# Patient Record
Sex: FEMALE | Race: BLACK OR AFRICAN AMERICAN | NOT HISPANIC OR LATINO | Employment: OTHER | URBAN - METROPOLITAN AREA
[De-identification: names, ages, dates, MRNs, and addresses within clinical notes are randomized per-mention and may not be internally consistent; named-entity substitution may affect disease eponyms.]

---

## 2017-02-21 ENCOUNTER — TELEPHONE (OUTPATIENT)
Dept: FAMILY MEDICINE | Facility: CLINIC | Age: 72
End: 2017-02-21

## 2017-02-21 NOTE — TELEPHONE ENCOUNTER
----- Message from Amber Jack sent at 2/21/2017  9:52 AM CST -----  Contact: Self   Pt calling to speak to a nurse regarding her health. Please call [phone]

## 2017-02-22 ENCOUNTER — TELEPHONE (OUTPATIENT)
Dept: FAMILY MEDICINE | Facility: CLINIC | Age: 72
End: 2017-02-22

## 2017-02-22 NOTE — TELEPHONE ENCOUNTER
----- Message from Christel Wang sent at 2/21/2017 11:17 AM CST -----  Contact: Self  Pt returned call. Pt can be reached @ [phone].

## 2017-02-23 ENCOUNTER — TELEPHONE (OUTPATIENT)
Dept: FAMILY MEDICINE | Facility: CLINIC | Age: 72
End: 2017-02-23

## 2017-02-23 NOTE — TELEPHONE ENCOUNTER
Received fax from pharmacy informing that pts jaime is available in a cost saving generic alternative; called pt to see if she was ok with changing to generic; no answer; LM to call office

## 2017-03-01 NOTE — TELEPHONE ENCOUNTER
Informed pt of below; pt does not want to change to generic, states her body is sensitive to medications and she would prefer to stay on brand

## 2017-03-06 ENCOUNTER — TELEPHONE (OUTPATIENT)
Dept: FAMILY MEDICINE | Facility: CLINIC | Age: 72
End: 2017-03-06

## 2017-03-06 NOTE — TELEPHONE ENCOUNTER
----- Message from Magdalena Patel sent at 3/3/2017  2:36 PM CST -----  Contact: self  Pt stated that [facility] will fill the script for jaime. Please advise.    [phone]-[facility]

## 2017-03-06 NOTE — TELEPHONE ENCOUNTER
Advised patient that script for Prashant was sent to [facility] on 3/3/2017.  Patient verbalized understanding.

## 2017-03-19 DIAGNOSIS — I10 ESSENTIAL HYPERTENSION: ICD-10-CM

## 2017-03-20 DIAGNOSIS — E03.4 HYPOTHYROIDISM DUE TO ACQUIRED ATROPHY OF THYROID: ICD-10-CM

## 2017-03-20 RX ORDER — FUROSEMIDE 20 MG/1
TABLET ORAL
Qty: 30 TABLET | Refills: 0 | Status: SHIPPED | OUTPATIENT
Start: 2017-03-20 | End: 2017-09-06 | Stop reason: SDUPTHER

## 2017-03-20 RX ORDER — LEVOTHYROXINE SODIUM 50 UG/1
TABLET ORAL
Qty: 30 TABLET | Refills: 0 | Status: SHIPPED | OUTPATIENT
Start: 2017-03-20 | End: 2017-09-06 | Stop reason: SDUPTHER

## 2017-03-23 DIAGNOSIS — I10 ESSENTIAL HYPERTENSION: ICD-10-CM

## 2017-03-23 RX ORDER — POTASSIUM CHLORIDE 750 MG/1
TABLET, EXTENDED RELEASE ORAL
Qty: 30 TABLET | Refills: 0 | Status: SHIPPED | OUTPATIENT
Start: 2017-03-23 | End: 2017-09-06 | Stop reason: SDUPTHER

## 2017-03-27 RX ORDER — METFORMIN HYDROCHLORIDE 500 MG/1
TABLET ORAL
Qty: 30 TABLET | Refills: 0 | Status: SHIPPED | OUTPATIENT
Start: 2017-03-27 | End: 2017-03-31 | Stop reason: SDUPTHER

## 2017-03-27 NOTE — LETTER
March 30, 2017    Shefali Fowler  [address]  [place] [address]             [facility]  [address] [place] [address]  Phone: [phone]  Fax: [phone] Dear Mrs. Fowler:    This letter is a reminder that your Hgb A1C test is due. Please call our office to schedule an appointment.    If you've already underwent or scheduled this procedure, please disregard this notice.    Sincerely,        Mariana Dinh LPN

## 2017-03-31 RX ORDER — METFORMIN HYDROCHLORIDE 500 MG/1
TABLET ORAL
Qty: 30 TABLET | Refills: 0 | Status: SHIPPED | OUTPATIENT
Start: 2017-03-31 | End: 2017-09-06 | Stop reason: SDUPTHER

## 2017-04-05 LAB — HEMOGLOBIN A1: 7.3 (ref ?–5.7)

## 2017-04-06 LAB
CHOLESTEROL, TOTAL: 176 (ref 125–200)
CHOLESTEROL/HDL RATIO SCREEN: 2.1 (ref ?–5)
HDLC SERPL-MCNC: 85 MG/DL (ref ?–46)
LDLC SERPL CALC-MCNC: 78 MG/DL (ref ?–130)
TRIGL SERPL-MCNC: 64 MG/DL (ref ?–150)

## 2017-08-15 DIAGNOSIS — N95.1 MENOPAUSAL SYMPTOMS: ICD-10-CM

## 2017-08-15 NOTE — TELEPHONE ENCOUNTER
----- Message from Dona Soler sent at 8/14/2017  1:57 PM CDT -----  Patient states they no longer manufactor cloNIDine 0.1 mg/24 hr td ptwk (CATAPRES) 0.1 mg/24 hr. She states she tried the new patches but makes her sick.     Also, she needs a refill on PREMARIN 0.625 mg tablet.    Patient would like a call back at [phone] Thank you!

## 2017-08-15 NOTE — TELEPHONE ENCOUNTER
for catapres patch changed and it makes pt sick, she looked at different pharmacies but no one had  she wanted, pt has not taken since last month and blood pressure has been running 130/80's; also needs refill on premarin; she has OV scheduled for 9/6/17

## 2017-08-24 DIAGNOSIS — Z12.11 COLON CANCER SCREENING: ICD-10-CM

## 2017-08-31 RX ORDER — CLONIDINE 0.1 MG/24H
PATCH, EXTENDED RELEASE TRANSDERMAL
Qty: 4 PATCH | Refills: 0 | Status: SHIPPED | OUTPATIENT
Start: 2017-08-31 | End: 2017-09-06

## 2017-09-06 ENCOUNTER — OFFICE VISIT (OUTPATIENT)
Dept: FAMILY MEDICINE | Facility: CLINIC | Age: 72
End: 2017-09-06
Payer: MEDICARE

## 2017-09-06 VITALS
WEIGHT: 152.13 LBS | BODY MASS INDEX: 23.88 KG/M2 | SYSTOLIC BLOOD PRESSURE: 120 MMHG | HEART RATE: 68 BPM | HEIGHT: 67 IN | OXYGEN SATURATION: 97 % | DIASTOLIC BLOOD PRESSURE: 68 MMHG | TEMPERATURE: 98 F

## 2017-09-06 DIAGNOSIS — I10 ESSENTIAL HYPERTENSION, BENIGN: ICD-10-CM

## 2017-09-06 DIAGNOSIS — N95.1 MENOPAUSAL SYMPTOMS: ICD-10-CM

## 2017-09-06 DIAGNOSIS — E03.4 HYPOTHYROIDISM DUE TO ACQUIRED ATROPHY OF THYROID: ICD-10-CM

## 2017-09-06 DIAGNOSIS — N18.2 CONTROLLED TYPE 2 DIABETES MELLITUS WITH STAGE 2 CHRONIC KIDNEY DISEASE, WITHOUT LONG-TERM CURRENT USE OF INSULIN: Primary | ICD-10-CM

## 2017-09-06 DIAGNOSIS — E11.22 CONTROLLED TYPE 2 DIABETES MELLITUS WITH STAGE 2 CHRONIC KIDNEY DISEASE, WITHOUT LONG-TERM CURRENT USE OF INSULIN: Primary | ICD-10-CM

## 2017-09-06 PROCEDURE — 3074F SYST BP LT 130 MM HG: CPT | Mod: ,,, | Performed by: FAMILY MEDICINE

## 2017-09-06 PROCEDURE — 4010F ACE/ARB THERAPY RXD/TAKEN: CPT | Mod: ,,, | Performed by: FAMILY MEDICINE

## 2017-09-06 PROCEDURE — 1126F AMNT PAIN NOTED NONE PRSNT: CPT | Mod: ,,, | Performed by: FAMILY MEDICINE

## 2017-09-06 PROCEDURE — 99999 PR PBB SHADOW E&M-EST. PATIENT-LVL III: CPT | Mod: PBBFAC,,, | Performed by: FAMILY MEDICINE

## 2017-09-06 PROCEDURE — 99214 OFFICE O/P EST MOD 30 MIN: CPT | Mod: S$PBB,,, | Performed by: FAMILY MEDICINE

## 2017-09-06 PROCEDURE — 99213 OFFICE O/P EST LOW 20 MIN: CPT | Mod: PBBFAC,PO | Performed by: FAMILY MEDICINE

## 2017-09-06 PROCEDURE — 1159F MED LIST DOCD IN RCRD: CPT | Mod: ,,, | Performed by: FAMILY MEDICINE

## 2017-09-06 PROCEDURE — 3078F DIAST BP <80 MM HG: CPT | Mod: ,,, | Performed by: FAMILY MEDICINE

## 2017-09-06 RX ORDER — CETIRIZINE HYDROCHLORIDE 10 MG/1
10 TABLET ORAL DAILY
Qty: 30 TABLET | Refills: 11 | Status: SHIPPED | OUTPATIENT
Start: 2017-09-06

## 2017-09-06 RX ORDER — SAXAGLIPTIN 2.5 MG/1
2.5 TABLET, FILM COATED ORAL DAILY
Qty: 30 TABLET | Refills: 5 | Status: SHIPPED | OUTPATIENT
Start: 2017-09-06 | End: 2017-11-13 | Stop reason: CLARIF

## 2017-09-06 RX ORDER — FUROSEMIDE 20 MG/1
20 TABLET ORAL DAILY
Qty: 30 TABLET | Refills: 5 | Status: SHIPPED | OUTPATIENT
Start: 2017-09-06

## 2017-09-06 RX ORDER — AMLODIPINE AND OLMESARTAN MEDOXOMIL 10; 40 MG/1; MG/1
1 TABLET ORAL DAILY
Qty: 30 TABLET | Refills: 5 | Status: SHIPPED | OUTPATIENT
Start: 2017-09-06 | End: 2017-11-27 | Stop reason: SDUPTHER

## 2017-09-06 RX ORDER — POTASSIUM CHLORIDE 750 MG/1
10 TABLET, EXTENDED RELEASE ORAL DAILY
Qty: 30 TABLET | Refills: 5 | Status: SHIPPED | OUTPATIENT
Start: 2017-09-06

## 2017-09-06 RX ORDER — BISOPROLOL FUMARATE AND HYDROCHLOROTHIAZIDE 10; 6.25 MG/1; MG/1
1 TABLET ORAL DAILY
Qty: 30 TABLET | Refills: 5 | Status: SHIPPED | OUTPATIENT
Start: 2017-09-06

## 2017-09-06 RX ORDER — LEVOTHYROXINE SODIUM 50 UG/1
50 TABLET ORAL DAILY
Qty: 30 TABLET | Refills: 5 | Status: SHIPPED | OUTPATIENT
Start: 2017-09-06

## 2017-09-06 RX ORDER — METFORMIN HYDROCHLORIDE 500 MG/1
500 TABLET ORAL DAILY
Qty: 30 TABLET | Refills: 5 | Status: SHIPPED | OUTPATIENT
Start: 2017-09-06 | End: 2017-11-13 | Stop reason: ALTCHOICE

## 2017-09-06 NOTE — PROGRESS NOTES
Subjective:       Patient ID: Shefali Fowler is a 72 y.o. female.    Chief Complaint: Medication Problem    HPI:  Here for follow up HTN, diabetes and CHF.  Discontinued Clonidine due to side effects.  BP has been stable.  Patient's diabetes is fairly well controlled  Review of Systems   Constitutional: Negative for fatigue and unexpected weight change.   Eyes: Negative for visual disturbance.   Respiratory: Negative for shortness of breath.    Cardiovascular: Negative for chest pain and leg swelling.   Endocrine: Negative for polydipsia, polyphagia and polyuria.   Skin: Negative for wound.   Neurological: Negative for numbness.       Objective:      Physical Exam   Constitutional: She is oriented to person, place, and time. She appears well-developed and well-nourished.   HENT:   Head: Normocephalic and atraumatic.   Mouth/Throat: Oropharynx is clear and moist.   Eyes: Conjunctivae are normal. Pupils are equal, round, and reactive to light.   Neck: Normal range of motion. Neck supple. No thyromegaly present.   Cardiovascular: Normal rate, regular rhythm and normal heart sounds.    No murmur heard.  Pulses:       Dorsalis pedis pulses are 2+ on the right side, and 2+ on the left side.   Pulmonary/Chest: Effort normal and breath sounds normal. No respiratory distress. She has no wheezes. She has no rales.   Abdominal: Soft. Bowel sounds are normal. She exhibits no distension and no mass. There is no tenderness.   Musculoskeletal: She exhibits no edema.        Right foot: There is no deformity.        Left foot: There is no deformity.   Feet:   Right Foot:   Protective Sensation: 5 sites tested. 5 sites sensed.   Skin Integrity: Negative for ulcer.   Left Foot:   Protective Sensation: 5 sites tested. 5 sites sensed.   Skin Integrity: Negative for ulcer.   Lymphadenopathy:     She has no cervical adenopathy.   Neurological: She is alert and oriented to person, place, and time.   Skin: Skin is warm and dry. No rash  noted.   Psychiatric: She has a normal mood and affect.         Assessment:       1. Controlled type 2 diabetes mellitus with stage 2 chronic kidney disease, without long-term current use of insulin    2. Essential hypertension, benign    3. Hypothyroidism due to acquired atrophy of thyroid    4. Menopausal symptoms        Plan:       Controlled type 2 diabetes mellitus with stage 2 chronic kidney disease, without long-term current use of insulin  -     SAXagliptin (ONGLYZA) 2.5 mg Tab tablet; Take 1 tablet (2.5 mg total) by mouth once daily.  Dispense: 30 tablet; Refill: 5  -     metformin (GLUCOPHAGE) 500 MG tablet; Take 1 tablet (500 mg total) by mouth once daily.  Dispense: 30 tablet; Refill: 5    Essential hypertension, benign  -     potassium chloride (KLOR-CON) 10 MEQ TbSR; Take 1 tablet (10 mEq total) by mouth once daily.  Dispense: 30 tablet; Refill: 5  -     furosemide (LASIX) 20 MG tablet; Take 1 tablet (20 mg total) by mouth once daily.  Dispense: 30 tablet; Refill: 5  -     amlodipine-olmesartan (VIVEK) 10-40 mg per tablet; Take 1 tablet by mouth once daily.  Dispense: 30 tablet; Refill: 5  -     bisoprolol-hydrochlorothiazide (ZIAC) 10-6.25 mg per tablet; Take 1 tablet by mouth once daily.  Dispense: 30 tablet; Refill: 5    Hypothyroidism due to acquired atrophy of thyroid  -     levothyroxine (SYNTHROID) 50 MCG tablet; Take 1 tablet (50 mcg total) by mouth once daily.  Dispense: 30 tablet; Refill: 5    Menopausal symptoms  -     estrogens, conjugated, (PREMARIN) 0.625 MG tablet; Take 1 tablet (0.625 mg total) by mouth once daily.  Dispense: 30 tablet; Refill: 5    Other orders  -     cetirizine (ZYRTEC) 10 MG tablet; Take 1 tablet (10 mg total) by mouth once daily.  Dispense: 30 tablet; Refill: 11            Return in about 6 months (around 3/6/2018).

## 2017-09-07 ENCOUNTER — TELEPHONE (OUTPATIENT)
Dept: ADMINISTRATIVE | Facility: HOSPITAL | Age: 72
End: 2017-09-07

## 2017-09-14 ENCOUNTER — TELEPHONE (OUTPATIENT)
Dept: FAMILY MEDICINE | Facility: CLINIC | Age: 72
End: 2017-09-14

## 2017-09-14 DIAGNOSIS — E11.22 CONTROLLED TYPE 2 DIABETES MELLITUS WITH STAGE 2 CHRONIC KIDNEY DISEASE, WITHOUT LONG-TERM CURRENT USE OF INSULIN: ICD-10-CM

## 2017-09-14 DIAGNOSIS — N18.2 CONTROLLED TYPE 2 DIABETES MELLITUS WITH STAGE 2 CHRONIC KIDNEY DISEASE, WITHOUT LONG-TERM CURRENT USE OF INSULIN: ICD-10-CM

## 2017-09-14 NOTE — TELEPHONE ENCOUNTER
----- Message from Gely Kennedy MD sent at 9/9/2017  8:06 PM CDT -----  Hba1c 7.3%.  Recommend we increase Onglyza to 5 mg daily.  Lipids wnl

## 2017-09-14 NOTE — TELEPHONE ENCOUNTER
According to our records hba1c 4/5/17 was 7.3%.  Hba1c was 6.9% 7/21/16.  I recommend we obtain a repeat hba1c now, since it has been almost been 6 months.

## 2017-09-19 ENCOUNTER — LAB VISIT (OUTPATIENT)
Dept: LAB | Facility: HOSPITAL | Age: 72
End: 2017-09-19
Attending: FAMILY MEDICINE
Payer: MEDICARE

## 2017-09-19 DIAGNOSIS — E11.22 CONTROLLED TYPE 2 DIABETES MELLITUS WITH STAGE 2 CHRONIC KIDNEY DISEASE, WITHOUT LONG-TERM CURRENT USE OF INSULIN: ICD-10-CM

## 2017-09-19 DIAGNOSIS — N18.2 CONTROLLED TYPE 2 DIABETES MELLITUS WITH STAGE 2 CHRONIC KIDNEY DISEASE, WITHOUT LONG-TERM CURRENT USE OF INSULIN: ICD-10-CM

## 2017-09-19 LAB
ESTIMATED AVG GLUCOSE: 157 MG/DL
HBA1C MFR BLD HPLC: 7.1 %

## 2017-09-19 PROCEDURE — 83036 HEMOGLOBIN GLYCOSYLATED A1C: CPT

## 2017-09-19 PROCEDURE — 36415 COLL VENOUS BLD VENIPUNCTURE: CPT | Mod: PO

## 2017-09-26 ENCOUNTER — TELEPHONE (OUTPATIENT)
Dept: FAMILY MEDICINE | Facility: CLINIC | Age: 72
End: 2017-09-26

## 2017-09-26 NOTE — TELEPHONE ENCOUNTER
Received fax from pharmacy stating prior authorization needed for onglyza; previous message pt states she is not taking onglyza and wanted to wait until repeat hgbA1c; results were 7.1 on 9/19/17; please advise if you want pt to take medication so I can complete prior authorization

## 2017-09-27 ENCOUNTER — TELEPHONE (OUTPATIENT)
Dept: FAMILY MEDICINE | Facility: CLINIC | Age: 72
End: 2017-09-27

## 2017-09-27 NOTE — TELEPHONE ENCOUNTER
----- Message from Gely Kennedy MD sent at 9/26/2017  5:58 PM CDT -----  hba1c 7.1%, will repeat labs in 3 months.

## 2017-11-13 ENCOUNTER — OFFICE VISIT (OUTPATIENT)
Dept: FAMILY MEDICINE | Facility: CLINIC | Age: 72
End: 2017-11-13
Payer: MEDICARE

## 2017-11-13 VITALS
HEART RATE: 72 BPM | DIASTOLIC BLOOD PRESSURE: 70 MMHG | WEIGHT: 147.5 LBS | BODY MASS INDEX: 23.15 KG/M2 | TEMPERATURE: 98 F | OXYGEN SATURATION: 99 % | HEIGHT: 67 IN | RESPIRATION RATE: 16 BRPM | SYSTOLIC BLOOD PRESSURE: 132 MMHG

## 2017-11-13 DIAGNOSIS — R19.7 DIARRHEA, UNSPECIFIED TYPE: Primary | ICD-10-CM

## 2017-11-13 DIAGNOSIS — F41.8 SITUATIONAL ANXIETY: ICD-10-CM

## 2017-11-13 DIAGNOSIS — E11.22 CONTROLLED TYPE 2 DIABETES MELLITUS WITH STAGE 2 CHRONIC KIDNEY DISEASE, WITHOUT LONG-TERM CURRENT USE OF INSULIN: ICD-10-CM

## 2017-11-13 DIAGNOSIS — N18.2 CONTROLLED TYPE 2 DIABETES MELLITUS WITH STAGE 2 CHRONIC KIDNEY DISEASE, WITHOUT LONG-TERM CURRENT USE OF INSULIN: ICD-10-CM

## 2017-11-13 PROCEDURE — 99214 OFFICE O/P EST MOD 30 MIN: CPT | Mod: S$PBB,,, | Performed by: FAMILY MEDICINE

## 2017-11-13 PROCEDURE — 99213 OFFICE O/P EST LOW 20 MIN: CPT | Mod: PBBFAC,PO | Performed by: FAMILY MEDICINE

## 2017-11-13 PROCEDURE — 99999 PR PBB SHADOW E&M-EST. PATIENT-LVL III: CPT | Mod: PBBFAC,,, | Performed by: FAMILY MEDICINE

## 2017-11-13 RX ORDER — GLIMEPIRIDE 1 MG/1
1 TABLET ORAL
Qty: 30 TABLET | Refills: 2 | Status: SHIPPED | OUTPATIENT
Start: 2017-11-13 | End: 2018-03-22 | Stop reason: SDUPTHER

## 2017-11-13 RX ORDER — BUSPIRONE HYDROCHLORIDE 15 MG/1
15 TABLET ORAL 3 TIMES DAILY PRN
Qty: 45 TABLET | Refills: 0 | Status: SHIPPED | OUTPATIENT
Start: 2017-11-13 | End: 2018-03-28

## 2017-11-13 RX ORDER — DICYCLOMINE HYDROCHLORIDE 20 MG/1
20 TABLET ORAL
Qty: 90 TABLET | Refills: 2 | Status: SHIPPED | OUTPATIENT
Start: 2017-11-13 | End: 2017-12-13

## 2017-11-13 NOTE — PROGRESS NOTES
Subjective:       Patient ID: Shefali Fowler is a 72 y.o. female.    Chief Complaint: Abdominal Pain (patient is grieving , and started having stomach ache , diarrhea. declines vaccines)    HPI:  Patient presents with upset stomach and diarrhea x 3 weeks.  Takes Metformin for diabetes since 2009.  Last c-scope 4/17.  A lot of stress recently due to death of family member.  Denies mucus or blood in stool. No recent antibiotics. Diarrhea does not awake her from sleep.  Review of Systems   Constitutional: Negative for fever.   Gastrointestinal: Negative for abdominal pain (cramping), blood in stool and vomiting.       Objective:      Physical Exam   Constitutional: She is oriented to person, place, and time. She appears well-developed and well-nourished.   Eyes: No scleral icterus.   Neck: Normal range of motion. Neck supple. No thyromegaly present.   Cardiovascular: Normal rate and regular rhythm.  Exam reveals no gallop and no friction rub.    No murmur heard.  Pulmonary/Chest: Effort normal and breath sounds normal. She has no wheezes. She has no rales.   Abdominal: Soft. Bowel sounds are normal. She exhibits no distension and no mass. There is no hepatosplenomegaly. There is no tenderness.   Musculoskeletal: She exhibits no edema.   Lymphadenopathy:     She has no cervical adenopathy.     She has no axillary adenopathy.        Right: No supraclavicular adenopathy present.        Left: No supraclavicular adenopathy present.   Neurological: She is alert and oriented to person, place, and time.   Skin: Skin is warm and dry. No rash noted.   Psychiatric: She has a normal mood and affect. Her behavior is normal.         Assessment:       1. Diarrhea, unspecified type    2. Controlled type 2 diabetes mellitus with stage 2 chronic kidney disease, without long-term current use of insulin        Plan:       Diarrhea, unspecified type  -     dicyclomine (BENTYL) 20 mg tablet; Take 1 tablet (20 mg total) by mouth every meal as  needed.  Dispense: 90 tablet; Refill: 2  -     Clostridium difficile EIA; Future; Expected date: 11/27/2017    Controlled type 2 diabetes mellitus with stage 2 chronic kidney disease, without long-term current use of insulin  -     glimepiride (AMARYL) 1 MG tablet; Take 1 tablet (1 mg total) by mouth before breakfast.  Dispense: 30 tablet; Refill: 2  Trial of metformin due to diarrhea      Return in about 2 weeks (around 11/27/2017).

## 2017-11-27 ENCOUNTER — OFFICE VISIT (OUTPATIENT)
Dept: FAMILY MEDICINE | Facility: CLINIC | Age: 72
End: 2017-11-27
Payer: MEDICARE

## 2017-11-27 VITALS
DIASTOLIC BLOOD PRESSURE: 72 MMHG | BODY MASS INDEX: 23.36 KG/M2 | TEMPERATURE: 98 F | OXYGEN SATURATION: 99 % | HEIGHT: 67 IN | WEIGHT: 148.81 LBS | HEART RATE: 79 BPM | SYSTOLIC BLOOD PRESSURE: 136 MMHG

## 2017-11-27 DIAGNOSIS — I10 ESSENTIAL HYPERTENSION, BENIGN: ICD-10-CM

## 2017-11-27 DIAGNOSIS — E11.22 CONTROLLED TYPE 2 DIABETES MELLITUS WITH STAGE 2 CHRONIC KIDNEY DISEASE, WITHOUT LONG-TERM CURRENT USE OF INSULIN: ICD-10-CM

## 2017-11-27 DIAGNOSIS — R19.7 DIARRHEA, UNSPECIFIED TYPE: Primary | ICD-10-CM

## 2017-11-27 DIAGNOSIS — N18.2 CONTROLLED TYPE 2 DIABETES MELLITUS WITH STAGE 2 CHRONIC KIDNEY DISEASE, WITHOUT LONG-TERM CURRENT USE OF INSULIN: ICD-10-CM

## 2017-11-27 PROCEDURE — 99999 PR PBB SHADOW E&M-EST. PATIENT-LVL III: CPT | Mod: PBBFAC,,, | Performed by: FAMILY MEDICINE

## 2017-11-27 PROCEDURE — 99213 OFFICE O/P EST LOW 20 MIN: CPT | Mod: PBBFAC,PO | Performed by: FAMILY MEDICINE

## 2017-11-27 PROCEDURE — 99213 OFFICE O/P EST LOW 20 MIN: CPT | Mod: S$PBB,,, | Performed by: FAMILY MEDICINE

## 2017-11-27 RX ORDER — AMLODIPINE AND OLMESARTAN MEDOXOMIL 10; 40 MG/1; MG/1
1 TABLET ORAL DAILY
Qty: 30 TABLET | Refills: 5 | Status: SHIPPED | OUTPATIENT
Start: 2017-11-27 | End: 2018-04-20

## 2017-11-27 NOTE — PROGRESS NOTES
Subjective:       Patient ID: Shefali Fowler is a 72 y.o. female.    Chief Complaint: Diarrhea (follow up)    HPI:  Here for follow up diarrhea.  Diarrhea resolved with discontinuation of Metformin. Reports high fiber diet.  Only having 1 bowel movement daily.  Discontinued dicyclomine.  Blood sugars stable on Onglyza.  BS range 103-138.  Patient reports wheezing x 1 week.  She reports being diagnosed with CHF in the past.  No URI symptoms.  Review of Systems   Constitutional: Negative for unexpected weight change.   Cardiovascular: Negative for leg swelling.   Gastrointestinal: Negative for abdominal pain.       Objective:      Physical Exam   Constitutional: She appears well-developed and well-nourished.   Musculoskeletal: She exhibits no edema.         Assessment:       1. Diarrhea, unspecified type    2. Essential hypertension, benign    3. Controlled type 2 diabetes mellitus with stage 2 chronic kidney disease, without long-term current use of insulin        Plan:       Diarrhea, unspecified type  Resolved, suspect related to metformin    Essential hypertension, benign  -     amlodipine-olmesartan (VIVEK) 10-40 mg per tablet; Take 1 tablet by mouth once daily.  Dispense: 30 tablet; Refill: 5    Controlled type 2 diabetes mellitus with stage 2 chronic kidney disease, without long-term current use of insulin  Glucose stable on Onglyza          No Follow-up on file.

## 2018-01-19 DIAGNOSIS — N95.1 MENOPAUSAL SYMPTOMS: ICD-10-CM

## 2018-01-19 DIAGNOSIS — Z12.31 ENCOUNTER FOR SCREENING MAMMOGRAM FOR BREAST CANCER: Primary | ICD-10-CM

## 2018-01-20 RX ORDER — ESTROGENS, CONJUGATED 0.62 MG/1
TABLET, FILM COATED ORAL
Qty: 30 TABLET | Refills: 0 | OUTPATIENT
Start: 2018-01-20

## 2018-01-22 NOTE — TELEPHONE ENCOUNTER
Pt informed due for mammogram; she states cannot afford at this time, states she has higher deductible and cannot get done right now; states she will call back in few months to get done

## 2018-01-30 ENCOUNTER — TELEPHONE (OUTPATIENT)
Dept: FAMILY MEDICINE | Facility: CLINIC | Age: 73
End: 2018-01-30

## 2018-01-30 NOTE — TELEPHONE ENCOUNTER
----- Message from Elsa Hunt sent at 1/30/2018  1:53 PM CST -----  Contact: self  amlodipine-olmesartan (VIVEK) 10-40 mg per tablet    Patient calling to speak with staff regarding medication listed above. She can be reached at [phone]. Thank you!

## 2018-01-30 NOTE — TELEPHONE ENCOUNTER
Pt states she had to start taking the generic amlodipine-olmesartan because jaime was not covered by her insurance anymore, she states the generic medication is causing her to have slight chest discomfort, and makes her feet cold; she is requesting alternative medication be sent in

## 2018-02-04 DIAGNOSIS — N95.1 MENOPAUSAL SYMPTOMS: ICD-10-CM

## 2018-02-05 ENCOUNTER — OFFICE VISIT (OUTPATIENT)
Dept: FAMILY MEDICINE | Facility: CLINIC | Age: 73
End: 2018-02-05
Payer: MEDICARE

## 2018-02-05 ENCOUNTER — LAB VISIT (OUTPATIENT)
Dept: LAB | Facility: HOSPITAL | Age: 73
End: 2018-02-05
Attending: FAMILY MEDICINE
Payer: MEDICARE

## 2018-02-05 VITALS
HEIGHT: 67 IN | DIASTOLIC BLOOD PRESSURE: 76 MMHG | BODY MASS INDEX: 22.32 KG/M2 | OXYGEN SATURATION: 98 % | SYSTOLIC BLOOD PRESSURE: 124 MMHG | TEMPERATURE: 98 F | RESPIRATION RATE: 14 BRPM | HEART RATE: 72 BPM | WEIGHT: 142.19 LBS

## 2018-02-05 DIAGNOSIS — M94.9 DISORDER OF BONE AND CARTILAGE: ICD-10-CM

## 2018-02-05 DIAGNOSIS — Z11.59 NEED FOR HEPATITIS C SCREENING TEST: ICD-10-CM

## 2018-02-05 DIAGNOSIS — N18.2 CONTROLLED TYPE 2 DIABETES MELLITUS WITH STAGE 2 CHRONIC KIDNEY DISEASE, WITHOUT LONG-TERM CURRENT USE OF INSULIN: ICD-10-CM

## 2018-02-05 DIAGNOSIS — I10 ESSENTIAL HYPERTENSION, BENIGN: ICD-10-CM

## 2018-02-05 DIAGNOSIS — E11.22 CONTROLLED TYPE 2 DIABETES MELLITUS WITH STAGE 2 CHRONIC KIDNEY DISEASE, WITHOUT LONG-TERM CURRENT USE OF INSULIN: ICD-10-CM

## 2018-02-05 DIAGNOSIS — E03.9 ACQUIRED HYPOTHYROIDISM: ICD-10-CM

## 2018-02-05 DIAGNOSIS — Z23 NEEDS FLU SHOT: Primary | ICD-10-CM

## 2018-02-05 DIAGNOSIS — M89.9 DISORDER OF BONE AND CARTILAGE: ICD-10-CM

## 2018-02-05 LAB
ALBUMIN SERPL BCP-MCNC: 3.4 G/DL
ALP SERPL-CCNC: 71 U/L
ALT SERPL W/O P-5'-P-CCNC: 144 U/L
ANION GAP SERPL CALC-SCNC: 7 MMOL/L
AST SERPL-CCNC: 124 U/L
BASOPHILS # BLD AUTO: 0.03 K/UL
BASOPHILS NFR BLD: 0.7 %
BILIRUB SERPL-MCNC: 0.4 MG/DL
BUN SERPL-MCNC: 9 MG/DL
CALCIUM SERPL-MCNC: 9.4 MG/DL
CHLORIDE SERPL-SCNC: 98 MMOL/L
CHOLEST SERPL-MCNC: 181 MG/DL
CHOLEST/HDLC SERPL: 2.1 {RATIO}
CO2 SERPL-SCNC: 29 MMOL/L
CREAT SERPL-MCNC: 0.9 MG/DL
DIFFERENTIAL METHOD: ABNORMAL
EOSINOPHIL # BLD AUTO: 0.2 K/UL
EOSINOPHIL NFR BLD: 5.5 %
ERYTHROCYTE [DISTWIDTH] IN BLOOD BY AUTOMATED COUNT: 12.7 %
EST. GFR  (AFRICAN AMERICAN): >60 ML/MIN/1.73 M^2
EST. GFR  (NON AFRICAN AMERICAN): >60 ML/MIN/1.73 M^2
GLUCOSE SERPL-MCNC: 142 MG/DL
HCT VFR BLD AUTO: 37.7 %
HDLC SERPL-MCNC: 85 MG/DL
HDLC SERPL: 47 %
HGB BLD-MCNC: 12.3 G/DL
IMM GRANULOCYTES # BLD AUTO: 0.01 K/UL
IMM GRANULOCYTES NFR BLD AUTO: 0.2 %
LDLC SERPL CALC-MCNC: 87.8 MG/DL
LYMPHOCYTES # BLD AUTO: 1.8 K/UL
LYMPHOCYTES NFR BLD: 42.3 %
MCH RBC QN AUTO: 28.7 PG
MCHC RBC AUTO-ENTMCNC: 32.6 G/DL
MCV RBC AUTO: 88 FL
MONOCYTES # BLD AUTO: 0.6 K/UL
MONOCYTES NFR BLD: 12.6 %
NEUTROPHILS # BLD AUTO: 1.7 K/UL
NEUTROPHILS NFR BLD: 38.7 %
NONHDLC SERPL-MCNC: 96 MG/DL
NRBC BLD-RTO: 0 /100 WBC
PLATELET # BLD AUTO: 217 K/UL
PMV BLD AUTO: 10.6 FL
POTASSIUM SERPL-SCNC: 4.1 MMOL/L
PROT SERPL-MCNC: 7.8 G/DL
RBC # BLD AUTO: 4.28 M/UL
SODIUM SERPL-SCNC: 134 MMOL/L
TRIGL SERPL-MCNC: 41 MG/DL
TSH SERPL DL<=0.005 MIU/L-ACNC: 3.17 UIU/ML
WBC # BLD AUTO: 4.35 K/UL

## 2018-02-05 PROCEDURE — 90662 IIV NO PRSV INCREASED AG IM: CPT | Mod: PBBFAC,PO

## 2018-02-05 PROCEDURE — 85025 COMPLETE CBC W/AUTO DIFF WBC: CPT

## 2018-02-05 PROCEDURE — 36415 COLL VENOUS BLD VENIPUNCTURE: CPT | Mod: PO

## 2018-02-05 PROCEDURE — 84443 ASSAY THYROID STIM HORMONE: CPT

## 2018-02-05 PROCEDURE — 1159F MED LIST DOCD IN RCRD: CPT | Mod: ,,, | Performed by: FAMILY MEDICINE

## 2018-02-05 PROCEDURE — 80053 COMPREHEN METABOLIC PANEL: CPT

## 2018-02-05 PROCEDURE — 86803 HEPATITIS C AB TEST: CPT

## 2018-02-05 PROCEDURE — 1126F AMNT PAIN NOTED NONE PRSNT: CPT | Mod: ,,, | Performed by: FAMILY MEDICINE

## 2018-02-05 PROCEDURE — 99214 OFFICE O/P EST MOD 30 MIN: CPT | Mod: PBBFAC,PO | Performed by: FAMILY MEDICINE

## 2018-02-05 PROCEDURE — 99214 OFFICE O/P EST MOD 30 MIN: CPT | Mod: S$PBB,,, | Performed by: FAMILY MEDICINE

## 2018-02-05 PROCEDURE — 99999 PR PBB SHADOW E&M-EST. PATIENT-LVL IV: CPT | Mod: PBBFAC,,, | Performed by: FAMILY MEDICINE

## 2018-02-05 PROCEDURE — 80061 LIPID PANEL: CPT

## 2018-02-05 RX ORDER — PRAVASTATIN SODIUM 20 MG/1
20 TABLET ORAL DAILY
Qty: 90 TABLET | Refills: 3 | Status: SHIPPED | OUTPATIENT
Start: 2018-02-05 | End: 2019-02-05

## 2018-02-05 RX ORDER — ESTROGENS, CONJUGATED 0.62 MG/1
TABLET, FILM COATED ORAL
Qty: 30 TABLET | Refills: 0 | Status: SHIPPED | OUTPATIENT
Start: 2018-02-05 | End: 2018-03-21 | Stop reason: SDUPTHER

## 2018-02-05 NOTE — PROGRESS NOTES
Subjective:       Patient ID: Shefali Fowler is a 72 y.o. female.    Chief Complaint: Medication Problem (since starting new meds , chest discomfort , itching)    HPI:  Here to discuss BP medication.  Reports side effects of itching with generic Prashant.  Also medication is costly.  BP stable.  Glucose stable.  Diarrhea resolved.  Review of Systems   Endocrine: Negative for polydipsia and polyphagia.   Musculoskeletal: Positive for myalgias.       Objective:    /70  Physical Exam   Constitutional: She appears well-developed and well-nourished.   Pulmonary/Chest: Effort normal.         Assessment:       1. Needs flu shot    2. Disorder of bone and cartilage    3. Need for hepatitis C screening test    4. Essential hypertension, benign    5. Acquired hypothyroidism    6. Controlled type 2 diabetes mellitus with stage 2 chronic kidney disease, without long-term current use of insulin        Plan:       Needs flu shot  -     Influenza - High Dose (65+) (PF) (IM)    Disorder of bone and cartilage  -     DXA Bone Density Spine And Hip; Future; Expected date: 02/05/2018    Need for hepatitis C screening test  -     Hepatitis C antibody; Future; Expected date: 02/05/2018    Essential hypertension, benign  BP stable  -     Ambulatory Referral to Pharmacy Assistance  -     Lipid panel; Future; Expected date: 02/05/2018  -     Comprehensive metabolic panel; Future; Expected date: 02/05/2018  -     CBC auto differential; Future; Expected date: 02/05/2018    Acquired hypothyroidism  Clinically stable  -     TSH; Future; Expected date: 02/05/2018    Controlled type 2 diabetes mellitus with stage 2 chronic kidney disease, without long-term current use of insulin  -     Add pravastatin (PRAVACHOL) 20 MG tablet; Take 1 tablet (20 mg total) by mouth once daily.  Dispense: 90 tablet; Refill: 3      Follow-up in about 6 months (around 8/5/2018).

## 2018-02-06 LAB — HCV AB SERPL QL IA: NEGATIVE

## 2018-02-15 ENCOUNTER — TELEPHONE (OUTPATIENT)
Dept: FAMILY MEDICINE | Facility: CLINIC | Age: 73
End: 2018-02-15

## 2018-02-15 NOTE — TELEPHONE ENCOUNTER
Pt wants result mailed to her. Pt says she has too much going on to discuss results over the phone        ----- Message from Gely Kennedy MD sent at 2/13/2018 11:04 PM CST -----  Abnormal liver test.  Hold pravastatin (cholesterol medication) for now.  Would like to get a liver ultrasound and consider hepatology consult.

## 2018-02-20 ENCOUNTER — TELEPHONE (OUTPATIENT)
Dept: FAMILY MEDICINE | Facility: CLINIC | Age: 73
End: 2018-02-20

## 2018-02-20 DIAGNOSIS — R79.89 ABNORMAL LFTS: Primary | ICD-10-CM

## 2018-02-20 NOTE — TELEPHONE ENCOUNTER
Pt informed of results and recommendations; states she will get liver ultrasound done next week; please enter orders; does not want to see any other doctors at this time

## 2018-03-14 ENCOUNTER — TELEPHONE (OUTPATIENT)
Dept: FAMILY MEDICINE | Facility: CLINIC | Age: 73
End: 2018-03-14

## 2018-03-14 NOTE — TELEPHONE ENCOUNTER
Pt states she is needing copy of orders for test she has scheduled because she is trying to get medicare to cover the 20% that she is supposed to pay; will  tomorrow

## 2018-03-14 NOTE — TELEPHONE ENCOUNTER
----- Message from Rianna Edgardo sent at 3/14/2018  3:14 PM CDT -----  Contact: self  Pt is asking for a copy of her orders for the test she needs to take to provide to Medicare for .  Pt call back # [phone]

## 2018-03-20 ENCOUNTER — TELEPHONE (OUTPATIENT)
Dept: FAMILY MEDICINE | Facility: CLINIC | Age: 73
End: 2018-03-20

## 2018-03-20 NOTE — TELEPHONE ENCOUNTER
Pt states appointment reminder said she has multiple test ordered and she wanted to clarify what test she had scheduled for tomorrow; informed her she has US abdomen and bone density; pt verbalized understanding; also wanted to know how long test takes; informed her I do not know exact time but it shouldn't take too long

## 2018-03-20 NOTE — TELEPHONE ENCOUNTER
----- Message from Gely Kennedy MD sent at 3/18/2018  4:57 PM CDT -----  Please call patient and send certified letter informing patient she needs to schedule liver u/s for abnormal LFT'

## 2018-03-20 NOTE — TELEPHONE ENCOUNTER
Liver US is scheduled for tomorrow; I did speak to her this morning regarding test scheduled for tomorrow; pt states she is going to get it done

## 2018-03-20 NOTE — TELEPHONE ENCOUNTER
----- Message from Rianna Reynoso sent at 3/20/2018  9:57 AM CDT -----  Contact: self  Pt is asking for a call back regarding procedures she has scheduled.    [phone]

## 2018-03-21 ENCOUNTER — HOSPITAL ENCOUNTER (OUTPATIENT)
Dept: RADIOLOGY | Facility: HOSPITAL | Age: 73
Discharge: HOME | End: 2018-03-21
Attending: FAMILY MEDICINE
Payer: MEDICARE

## 2018-03-21 DIAGNOSIS — N95.1 MENOPAUSAL SYMPTOMS: ICD-10-CM

## 2018-03-21 DIAGNOSIS — M89.9 DISORDER OF BONE AND CARTILAGE: ICD-10-CM

## 2018-03-21 DIAGNOSIS — M94.9 DISORDER OF BONE AND CARTILAGE: ICD-10-CM

## 2018-03-21 DIAGNOSIS — R79.89 ABNORMAL LFTS: ICD-10-CM

## 2018-03-21 PROCEDURE — 76700 US EXAM ABDOM COMPLETE: CPT | Mod: TC

## 2018-03-21 PROCEDURE — 76700 US EXAM ABDOM COMPLETE: CPT | Mod: 26,,, | Performed by: RADIOLOGY

## 2018-03-21 PROCEDURE — 77080 DXA BONE DENSITY AXIAL: CPT | Mod: 26,,, | Performed by: RADIOLOGY

## 2018-03-21 PROCEDURE — 77080 DXA BONE DENSITY AXIAL: CPT | Mod: TC

## 2018-03-22 ENCOUNTER — TELEPHONE (OUTPATIENT)
Dept: FAMILY MEDICINE | Facility: CLINIC | Age: 73
End: 2018-03-22

## 2018-03-22 DIAGNOSIS — N18.2 CONTROLLED TYPE 2 DIABETES MELLITUS WITH STAGE 2 CHRONIC KIDNEY DISEASE, WITHOUT LONG-TERM CURRENT USE OF INSULIN: ICD-10-CM

## 2018-03-22 DIAGNOSIS — R79.89 ABNORMAL LIVER FUNCTION TEST: Primary | ICD-10-CM

## 2018-03-22 DIAGNOSIS — E11.22 CONTROLLED TYPE 2 DIABETES MELLITUS WITH STAGE 2 CHRONIC KIDNEY DISEASE, WITHOUT LONG-TERM CURRENT USE OF INSULIN: ICD-10-CM

## 2018-03-22 RX ORDER — GLIMEPIRIDE 1 MG/1
TABLET ORAL
Qty: 30 TABLET | Refills: 0 | Status: SHIPPED | OUTPATIENT
Start: 2018-03-22

## 2018-03-22 NOTE — TELEPHONE ENCOUNTER
Informed pt that BMD  Is wnl . Pt verbally understood.    ----- Message from Gely Kennedy MD sent at 3/22/2018 11:04 AM CDT -----  BMD test wnl

## 2018-03-22 NOTE — TELEPHONE ENCOUNTER
Notified of Rx refill and information below. Pt scheduled. Pending orders. Please advise of orders to be linked

## 2018-03-23 ENCOUNTER — DOCUMENTATION ONLY (OUTPATIENT)
Dept: TRANSPLANT | Facility: CLINIC | Age: 73
End: 2018-03-23

## 2018-03-23 NOTE — NURSING
Pt records reviewed.   Pt will be referred to Hepatology.    Initial referral received  from the [facility].   Referring Provider/diagnosis  RADHA SHIELDS Provider:   Diagnosis: Abnormal liver function test           Referral letter sent to provider and patient.

## 2018-03-27 NOTE — PROGRESS NOTES
[facility] NEW PT NOTE    REFERRING PROVIDER:  Dr. Gely Kennedy    CHIEF COMPLAINT: elevated liver enzymes    HPI: This is a 72 y.o. Black or  female with PMH noted below, presenting for evaluation of acute elevation of liver enzymes noted on labs 2/5/18.     Presents today alone.     She was referred to hepatology clinic by her PCP Dr. Kennedy for acutely elevated liver enzymes.     Of note: patient was prescribed Pravastatin for cardiovascular prevention on 2/5/18 but did not start medication after PCP instruction due to elevated liver enzymes, so pravastatin not contributing factor.     Labs done 2/5/18 show acute elevation of AST and ALT, but previous transaminase levels from 2015 and 2016 WNL. Platelets, synthetic liver functioning WNL, except albumin mildly decreased.   Abd U/S done 3/21/2018 showed normal liver and spleen size, no lesions, no abnormalities of the liver.     Limited previous serologic w/u was negative for Hep C.     Risk factors for NAFLD include HTN, DM. Diet - improved since Nov 2017.  Weight loss of 10 pounds since 9/2017 (intentional). Denies family history of liver disease. Denies alcohol consumption currently or in the past.     Immunity to Hep A and B - unknown per labs.  Denies knowledge of receiving Hep vaccines in past.     Herbal medications - none. Has had some medication changes to HTN medications since July 2017, including change of VIVEK to patient reported generic formulation, but per patient's medication list no changes to base medication listed in VIVEK, which appears to include olmesartan.     Of note: does have CHF listed in patient's history. Pt reports was diagnosed in the 1990s, on Lasix for years, changed to prn years ago, has not required in months. Denies SOB, BLE edema.     Denies jaundice, dark urine, abdominal distention, hematemesis, melena, slowed mentation. No abnormal skin rashes. No generalized joint or muscle pain.       Review of patient's allergies indicates:   Allergen Reactions    Diflucan [fluconazole]     Iodinated contrast- oral and iv dye     Sulfa (sulfonamide antibiotics)        Current Outpatient Prescriptions on File Prior to Visit   Medication Sig Dispense Refill    amlodipine-olmesartan (VIVEK) 10-40 mg per tablet Take 1 tablet by mouth once daily. 30 tablet 5    aspirin (ECOTRIN) 81 MG EC tablet Take 81 mg by mouth once daily.      b complex vitamins tablet Take 1 tablet by mouth once daily.      bisoprolol-hydrochlorothiazide (ZIAC) 10-6.25 mg per tablet Take 1 tablet by mouth once daily. 30 tablet 5    cetirizine (ZYRTEC) 10 MG tablet Take 1 tablet (10 mg total) by mouth once daily. 30 tablet 11    estrogens, conjugated, (PREMARIN) 0.625 MG tablet Take 1 tablet (0.625 mg total) by mouth once daily. 30 tablet 0    furosemide (LASIX) 20 MG tablet Take 1 tablet (20 mg total) by mouth once daily. (Patient taking differently: Take 20 mg by mouth as needed. ) 30 tablet 5    glimepiride (AMARYL) 1 MG tablet TAKE 1 TABLET BY MOUTH BEFORE BREAKFAST 30 tablet 0    levothyroxine (SYNTHROID) 50 MCG tablet Take 1 tablet (50 mcg total) by mouth once daily. 30 tablet 5    multivitamin capsule Take 1 capsule by mouth once daily.      potassium chloride (KLOR-CON) 10 MEQ TbSR Take 1 tablet (10 mEq total) by mouth once daily. 30 tablet 5    pravastatin (PRAVACHOL) 20 MG tablet Take 1 tablet (20 mg total) by mouth once daily. 90 tablet 3    VITAMIN E,DL-ALPHA TOCOPHEROL, (VITAMIN E, BULK, MISC) Use as directed 1 tablet in the mouth or throat once daily.      [DISCONTINUED] busPIRone (BUSPAR) 15 MG tablet Take 1 tablet (15 mg total) by mouth 3 (three) times daily as needed. 45 tablet 0     No current facility-administered medications on file prior to visit.        PMHX:  has a past medical history of Allergy; Cataract; CHF (congestive heart failure) (1997); Diabetes mellitus, type 2; Hypertension; and Thyroid  "disease.    PSHX:  has a past surgical history that includes Spine surgery (1983) and Colonoscopy (2017).    FAMILY HISTORY: Negative for liver disease, reviewed in EPIC    SOCIAL HISTORY:   History   Smoking Status    Former Smoker    Types: Cigarettes    Start date: 6/16/1965    Quit date: 6/16/1970   Smokeless Tobacco    Never Used       History   Alcohol Use No       History   Drug Use No       ROS:   GENERAL: Denies fever, chills, weight loss/gain, fatigue  HEENT: Denies headaches, dizziness, vision/hearing changes  CARDIOVASCULAR: Denies chest pain, palpitations, or edema  RESPIRATORY: Denies dyspnea, cough  GI: Denies abdominal pain, rectal bleeding, nausea, vomiting. No change in bowel pattern or color  : Denies dysuria, hematuria   SKIN: Denies rash, itching   NEURO: Denies confusion, memory loss, or mood changes  PSYCH: Denies depression or anxiety  HEME/LYMPH: Denies easy bruising or bleeding      PHYSICAL EXAM:   Friendly Black or  female, in no acute distress; alert and oriented to person, place and time  VITALS: BP (!) 158/69 (BP Location: Left arm, Patient Position: Sitting)   Pulse 73   Temp 96.5 °F (35.8 °C) (Oral)   Resp 18   Ht 5' 7" (1.702 m)   Wt 64.5 kg (142 lb 3.2 oz)   SpO2 100%   BMI 22.27 kg/m²   HENT: Normocephalic, without obvious abnormality. Oral mucosa pink and moist. Dentition good.  EYES: Sclerae anicteric. No conjunctival pallor.   NECK: Supple. No masses or cervical adenopathy.  CARDIOVASCULAR: Regular rate and rhythm. No murmurs.  RESPIRATORY: Normal respiratory effort. BBS CTA. No wheezes or crackles.  GI: Soft, non-tender, non-distended. No hepatosplenomegaly. No masses palpable. No ascites.  EXTREMITIES:  No clubbing, cyanosis or edema.  SKIN: Warm and dry. No jaundice. No rashes noted to exposed skin. No telangectasias noted. No palmar erythema.  NEURO:  Normal gate. No asterixis.  PSYCH:  Memory intact. Thought and speech pattern appropriate. " Behavior normal. No depression or anxiety noted.      RECENT LABS:    Hepatitis A and B immunity markers:    No results found for: HEPAIGG    Hepatitis B Surface Ag   Date Value Ref Range Status   03/28/2018 Negative  Final     No results found for: HEPBCAB  No results found for: HEPBSAB  Immunization History   Administered Date(s) Administered    Influenza 12/02/2011    Influenza - High Dose 10/14/2015, 10/17/2016, 02/05/2018       Labs:  Lab Results   Component Value Date    WBC 3.78 (L) 03/28/2018    HGB 12.5 03/28/2018    HCT 37.8 03/28/2018     03/28/2018    CHOL 181 02/05/2018    TRIG 41 02/05/2018    HDL 85 (H) 02/05/2018     (L) 03/28/2018    K 4.2 03/28/2018    CREATININE 0.9 03/28/2018    ALT 81 (H) 03/28/2018    AST 64 (H) 03/28/2018    ALKPHOS 54 (L) 03/28/2018    BILITOT 0.4 03/28/2018    ALBUMIN 3.5 03/28/2018    INR 1.0 03/28/2018       DIAGNOSTIC STUDIES:  EGD-  None   COLONOSCOPY-  None   ABD. U/S-  Done 3/21/2018  FINDINGS:  Liver: Normal in size, measuring 11.3 cm. Homogeneous echotexture.  No focal hepatic lesions.    Gallbladder: No calculi, wall thickening, or pericholecystic fluid.  No sonographic Wright's sign.    Biliary system: The common duct is not dilated, measuring 4.6 mm.  No intrahepatic ductal dilatation.    Spleen: Normal in size with a homogeneous echotexture, measuring 6.5 cm.    Pancreas: The visualized portions of pancreas appear normal.    Right kidney: Normal in size with no hydronephrosis, measuring 9.8 x 5.1 x 4.5 cm.    Left kidney:  Normal in size with no hydronephrosis, measuring 10.8 x 5.8 x 4.9 cm.    Aorta: No aneurysm.    Inferior vena cava: Normal in appearance.    Miscellaneous: No ascites.   Impression       Unremarkable sonographic appearance of the abdominal contents with no evidence of hepatic lesion or acute findings.       CT SCAN-  None   MRI-  None   LIVER BIOPSY-  None     ASSESSMENT:  72 y.o. Black or  female with:  1.  Acutely elevated liver enzymes, etiology unclear  (viral versus possible autoimmune drug induced with olmesartan versus autoimmune vs transient elevation)  -- Abd U/S 3/21/2018 showed normal liver and spleen size, no lesions, no abnormalities of the liver.   -- Labs 2/5/18 with acute elevation of AST and ALT, but previous transaminase levels from 2015 and 2016 WNL. Will repeat today .   -- Platelets, synthetic liver functioning WNL, except albumin mildly decreased.   -- negative Hep C, will obtain immunologic workup to assess possible causes  -- medications possibly contributing: olmesartan? Recent publication with possible link to autoimmune drug induced hepatitis with olmesartan.   --- if chronic liver disease determined (do not suspect), will consider Hep A and B immunity     2. Type 2 DM, HTN  -- can increase risk of fatty liver disease    3. CHF   -- pt unsure of details, reports diagnosed in ~1990s  -- using Lasix prn but no need for Lasix in months  -- denies SOB, BLE edema  -- no 2D Echo on file   -- consider as etiology to include in workup if liver enzymes remain elevated and other causes ruled out      EDUCATION:  Discussed workup to determine etiology of liver enzyme elevation. Also discussed possibility of need for biopsy if autoimmune markers are abnormal.     PLAN:  1. Labs today  2. If liver disease chronic (do not suspect), will check Hep A and B markers and arrange Hep A and B vaccines if needed   3. Continue to hold pravastatin for now while liver workup in progress  4. Follow up and possible need for biopsy pending results of above    Thank you for allowing me to participate in the care of Shefali Fowler    Sejal Melgar, RICARDO-C    ADDENDUM: Liver enzymes trending down, called patient and notified her. Na decreased. Will repeat CMP in 1 week and await remainder of labs to determine need for biopsy and etiology of hyponatremia.     I agree with the JS's assessment and plan as outlined  above.    Sarah Garza NP-C      CC'ed note to:   Gely Kennedy MD

## 2018-03-28 ENCOUNTER — LAB VISIT (OUTPATIENT)
Dept: LAB | Facility: HOSPITAL | Age: 73
End: 2018-03-28
Attending: NURSE PRACTITIONER
Payer: MEDICARE

## 2018-03-28 ENCOUNTER — OFFICE VISIT (OUTPATIENT)
Dept: HEPATOLOGY | Facility: CLINIC | Age: 73
End: 2018-03-28
Payer: MEDICARE

## 2018-03-28 VITALS
HEART RATE: 73 BPM | OXYGEN SATURATION: 100 % | RESPIRATION RATE: 18 BRPM | DIASTOLIC BLOOD PRESSURE: 69 MMHG | HEIGHT: 67 IN | BODY MASS INDEX: 22.32 KG/M2 | TEMPERATURE: 97 F | SYSTOLIC BLOOD PRESSURE: 158 MMHG | WEIGHT: 142.19 LBS

## 2018-03-28 DIAGNOSIS — E11.65 UNCONTROLLED TYPE 2 DIABETES MELLITUS WITH HYPERGLYCEMIA, WITHOUT LONG-TERM CURRENT USE OF INSULIN: ICD-10-CM

## 2018-03-28 DIAGNOSIS — I50.9 CONGESTIVE HEART FAILURE, UNSPECIFIED CONGESTIVE HEART FAILURE CHRONICITY, UNSPECIFIED CONGESTIVE HEART FAILURE TYPE: ICD-10-CM

## 2018-03-28 DIAGNOSIS — R74.8 ELEVATED LIVER ENZYMES: ICD-10-CM

## 2018-03-28 DIAGNOSIS — R74.8 ELEVATED LIVER ENZYMES: Primary | ICD-10-CM

## 2018-03-28 DIAGNOSIS — R94.5 ABNORMAL RESULTS OF LIVER FUNCTION STUDIES: ICD-10-CM

## 2018-03-28 DIAGNOSIS — I10 ESSENTIAL HYPERTENSION, BENIGN: ICD-10-CM

## 2018-03-28 LAB
ALBUMIN SERPL BCP-MCNC: 3.5 G/DL
ALP SERPL-CCNC: 54 U/L
ALT SERPL W/O P-5'-P-CCNC: 81 U/L
ANION GAP SERPL CALC-SCNC: 6 MMOL/L
AST SERPL-CCNC: 64 U/L
BASOPHILS # BLD AUTO: 0.02 K/UL
BASOPHILS NFR BLD: 0.5 %
BILIRUB SERPL-MCNC: 0.4 MG/DL
BUN SERPL-MCNC: 8 MG/DL
CALCIUM SERPL-MCNC: 9.4 MG/DL
CHLORIDE SERPL-SCNC: 97 MMOL/L
CK SERPL-CCNC: 50 U/L
CO2 SERPL-SCNC: 28 MMOL/L
CREAT SERPL-MCNC: 0.9 MG/DL
DIFFERENTIAL METHOD: ABNORMAL
EOSINOPHIL # BLD AUTO: 0.1 K/UL
EOSINOPHIL NFR BLD: 1.9 %
ERYTHROCYTE [DISTWIDTH] IN BLOOD BY AUTOMATED COUNT: 11.7 %
EST. GFR  (AFRICAN AMERICAN): >60 ML/MIN/1.73 M^2
EST. GFR  (NON AFRICAN AMERICAN): >60 ML/MIN/1.73 M^2
GLUCOSE SERPL-MCNC: 225 MG/DL
HAV IGM SERPL QL IA: NEGATIVE
HBV CORE IGM SERPL QL IA: NEGATIVE
HBV SURFACE AG SERPL QL IA: NEGATIVE
HCT VFR BLD AUTO: 37.8 %
HCV AB SERPL QL IA: NEGATIVE
HGB BLD-MCNC: 12.5 G/DL
IGG SERPL-MCNC: 1798 MG/DL
IGM SERPL-MCNC: 69 MG/DL
IMM GRANULOCYTES # BLD AUTO: 0.01 K/UL
IMM GRANULOCYTES NFR BLD AUTO: 0.3 %
INR PPP: 1
LYMPHOCYTES # BLD AUTO: 1.4 K/UL
LYMPHOCYTES NFR BLD: 37.6 %
MCH RBC QN AUTO: 29.1 PG
MCHC RBC AUTO-ENTMCNC: 33.1 G/DL
MCV RBC AUTO: 88 FL
MONOCYTES # BLD AUTO: 0.4 K/UL
MONOCYTES NFR BLD: 10.6 %
NEUTROPHILS # BLD AUTO: 1.9 K/UL
NEUTROPHILS NFR BLD: 49.1 %
NRBC BLD-RTO: 0 /100 WBC
PLATELET # BLD AUTO: 188 K/UL
PMV BLD AUTO: 9.7 FL
POTASSIUM SERPL-SCNC: 4.2 MMOL/L
PROT SERPL-MCNC: 7.5 G/DL
PROTHROMBIN TIME: 10.9 SEC
RBC # BLD AUTO: 4.29 M/UL
SODIUM SERPL-SCNC: 131 MMOL/L
WBC # BLD AUTO: 3.78 K/UL

## 2018-03-28 PROCEDURE — 99999 PR PBB SHADOW E&M-EST. PATIENT-LVL V: CPT | Mod: PBBFAC,,, | Performed by: NURSE PRACTITIONER

## 2018-03-28 PROCEDURE — 36415 COLL VENOUS BLD VENIPUNCTURE: CPT

## 2018-03-28 PROCEDURE — 86256 FLUORESCENT ANTIBODY TITER: CPT | Mod: 91

## 2018-03-28 PROCEDURE — 82550 ASSAY OF CK (CPK): CPT

## 2018-03-28 PROCEDURE — 85025 COMPLETE CBC W/AUTO DIFF WBC: CPT

## 2018-03-28 PROCEDURE — 80053 COMPREHEN METABOLIC PANEL: CPT

## 2018-03-28 PROCEDURE — 86038 ANTINUCLEAR ANTIBODIES: CPT

## 2018-03-28 PROCEDURE — 85610 PROTHROMBIN TIME: CPT

## 2018-03-28 PROCEDURE — 99204 OFFICE O/P NEW MOD 45 MIN: CPT | Mod: S$PBB,,, | Performed by: NURSE PRACTITIONER

## 2018-03-28 PROCEDURE — 82784 ASSAY IGA/IGD/IGG/IGM EACH: CPT

## 2018-03-28 PROCEDURE — 82784 ASSAY IGA/IGD/IGG/IGM EACH: CPT | Mod: 59

## 2018-03-28 PROCEDURE — 86235 NUCLEAR ANTIGEN ANTIBODY: CPT

## 2018-03-28 PROCEDURE — 99215 OFFICE O/P EST HI 40 MIN: CPT | Mod: PBBFAC | Performed by: NURSE PRACTITIONER

## 2018-03-28 PROCEDURE — 80074 ACUTE HEPATITIS PANEL: CPT

## 2018-03-28 NOTE — PROGRESS NOTES
I have personally performed a face to face diagnostic evaluation on this patient. I have reviewed and agree with today's findings and the care plan outlined by Sarah Wall NP with following comments:    Mrs. Fowler is a very pleasant 72-year-old lady with history of hypertension on olmesartan and other agents. She was found to have elevated transaminases in Feb 2018. She was prescribed statins but never started. No family or personal hx of autoimmune diseases. She does have diabetes, dyslipidemia and family hx of diabetes, however - her diabetes is well-controlled, she is not overweight. HOLCOMB is on differential but less likely as recent USG did not show steatosis either. 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 There are case reports which describe olmesartan-induced autoimmune hepatitis. Agree with will order serologies/autoimmune markers/iron/ceruloplasmin to r/o other possible causes of chronic liver disease for the sake of thoroughness in work-up. If autoimmune markers are suggestive of AIH, may need liver biopsy for confirmation, then would also stop olmesartan.      The patient will return to Sarah Wall NP  for follow-up.     Steph Cota MD   Hepatology  [facility]

## 2018-03-28 NOTE — PATIENT INSTRUCTIONS
1. Labs today  2. I will call you when I get your repeat liver tests  3. I will call you 1 week later with your other liver testing

## 2018-03-28 NOTE — LETTER
March 28, 2018      Gely Kennedy MD  [address] Loc[address] within [facility]  [place] [address]           [facility]  [address]  [facility] [address]  Phone: [phone]  Fax: [phone]          Patient: Shefali Fowler   MR Number: [id number]   YOB: 1945   Date of Visit: 3/28/2018       Dear Dr. Gely Kennedy:    Thank you for referring Shefali Fowler to me for evaluation. Attached you will find relevant portions of my assessment and plan of care.    If you have questions, please do not hesitate to call me. I look forward to following Shefali Fowler along with you.    Sincerely,    Sarah Wall, NP    Enclosure  CC:  No Recipients    If you would like to receive this communication electronically, please contact [email or web address] or [phone] to request more information on [employer] Link access.    For providers and/or their staff who would like to refer a patient to [facility], please contact us through our one-stop-shop provider referral line, [facility], at [phone].    If you feel you have received this communication in error or would no longer like to receive these types of communications, please e-mail [email or web address]

## 2018-03-29 LAB
ANA SER QL IF: NORMAL
MITOCHONDRIA AB TITR SER IF: NORMAL {TITER}

## 2018-04-02 ENCOUNTER — TELEPHONE (OUTPATIENT)
Dept: HEPATOLOGY | Facility: CLINIC | Age: 73
End: 2018-04-02

## 2018-04-02 DIAGNOSIS — R74.8 ELEVATED LIVER ENZYMES: ICD-10-CM

## 2018-04-02 LAB — SMOOTH MUSCLE AB TITR SER IF: ABNORMAL {TITER}

## 2018-04-02 NOTE — TELEPHONE ENCOUNTER
Attempted to contact pt to discuss lab results x3 attempts, was able to speak to pt today 4/3/18      Attempted to contact 4/2/18 10am, no answer.     ADDENDUM 4/2/18  1600: Attempted again to contact pt on both listed phone numbers, left message on her cell and home numbers to return my call. Will await return phone call.     ADDENDUM 4/3/18 1400:  Attempted again to contact pt on both listed phone numbers, left message on her cell to return my call, no answer at home number    ADDENDUM 4/3/18 1730: Spoke to patient, explained lab results including + IgG, mildly + ASMA, transaminase levels improving. Plan to repeat CMP Friday, then reassess need for biopsy, given + autoimmune markers. Will also assess trend of Olmesartan use with patient's pharmacy to determine medication changes/ dose changes        Please schedule patient for repeat CMP, CBC, and Hep A and B immunity labs at lab located at [facility] in [place]. Pt wishes to schedule lab for Friday, please call pt to schedule time     Thanks

## 2018-04-05 ENCOUNTER — PATIENT MESSAGE (OUTPATIENT)
Dept: FAMILY MEDICINE | Facility: CLINIC | Age: 73
End: 2018-04-05

## 2018-04-06 ENCOUNTER — LAB VISIT (OUTPATIENT)
Dept: LAB | Facility: HOSPITAL | Age: 73
End: 2018-04-06
Attending: FAMILY MEDICINE
Payer: MEDICARE

## 2018-04-06 DIAGNOSIS — R74.8 ELEVATED LIVER ENZYMES: ICD-10-CM

## 2018-04-06 LAB
ALBUMIN SERPL BCP-MCNC: 3.3 G/DL
ALP SERPL-CCNC: 52 U/L
ALT SERPL W/O P-5'-P-CCNC: 72 U/L
ANION GAP SERPL CALC-SCNC: 6 MMOL/L
AST SERPL-CCNC: 62 U/L
BASOPHILS # BLD AUTO: 0.02 K/UL
BASOPHILS NFR BLD: 0.5 %
BILIRUB SERPL-MCNC: 0.4 MG/DL
BUN SERPL-MCNC: 6 MG/DL
CALCIUM SERPL-MCNC: 8.8 MG/DL
CHLORIDE SERPL-SCNC: 96 MMOL/L
CO2 SERPL-SCNC: 27 MMOL/L
CREAT SERPL-MCNC: 0.9 MG/DL
DIFFERENTIAL METHOD: ABNORMAL
EOSINOPHIL # BLD AUTO: 0.1 K/UL
EOSINOPHIL NFR BLD: 3.2 %
ERYTHROCYTE [DISTWIDTH] IN BLOOD BY AUTOMATED COUNT: 11.9 %
EST. GFR  (AFRICAN AMERICAN): >60 ML/MIN/1.73 M^2
EST. GFR  (NON AFRICAN AMERICAN): >60 ML/MIN/1.73 M^2
GLUCOSE SERPL-MCNC: 111 MG/DL
HCT VFR BLD AUTO: 36.4 %
HGB BLD-MCNC: 12.2 G/DL
IMM GRANULOCYTES # BLD AUTO: 0 K/UL
IMM GRANULOCYTES NFR BLD AUTO: 0 %
LYMPHOCYTES # BLD AUTO: 2.2 K/UL
LYMPHOCYTES NFR BLD: 58 %
MCH RBC QN AUTO: 29.7 PG
MCHC RBC AUTO-ENTMCNC: 33.5 G/DL
MCV RBC AUTO: 89 FL
MONOCYTES # BLD AUTO: 0.5 K/UL
MONOCYTES NFR BLD: 12.7 %
NEUTROPHILS # BLD AUTO: 1 K/UL
NEUTROPHILS NFR BLD: 25.6 %
NRBC BLD-RTO: 0 /100 WBC
PLATELET # BLD AUTO: 204 K/UL
PMV BLD AUTO: 10.7 FL
POTASSIUM SERPL-SCNC: 4 MMOL/L
PROT SERPL-MCNC: 6.9 G/DL
RBC # BLD AUTO: 4.11 M/UL
SODIUM SERPL-SCNC: 129 MMOL/L
WBC # BLD AUTO: 3.71 K/UL

## 2018-04-06 PROCEDURE — 36415 COLL VENOUS BLD VENIPUNCTURE: CPT | Mod: PO

## 2018-04-06 PROCEDURE — 80053 COMPREHEN METABOLIC PANEL: CPT

## 2018-04-06 PROCEDURE — 86706 HEP B SURFACE ANTIBODY: CPT

## 2018-04-06 PROCEDURE — 85025 COMPLETE CBC W/AUTO DIFF WBC: CPT

## 2018-04-06 PROCEDURE — 86790 VIRUS ANTIBODY NOS: CPT

## 2018-04-06 PROCEDURE — 86704 HEP B CORE ANTIBODY TOTAL: CPT

## 2018-04-09 ENCOUNTER — TELEPHONE (OUTPATIENT)
Dept: HEPATOLOGY | Facility: CLINIC | Age: 73
End: 2018-04-09

## 2018-04-09 ENCOUNTER — PATIENT MESSAGE (OUTPATIENT)
Dept: HEPATOLOGY | Facility: CLINIC | Age: 73
End: 2018-04-09

## 2018-04-09 LAB
HBV CORE AB SERPL QL IA: NEGATIVE
HBV SURFACE AB SER-ACNC: NEGATIVE M[IU]/ML
HEPATITIS A ANTIBODY, IGG: POSITIVE

## 2018-04-09 NOTE — TELEPHONE ENCOUNTER
Pt did not read [facility] message. Attempted to call patient to discuss, attempting to determine etiology  For hyponatremia and possible autoimmune marker elevation.     Patient reports she took a dose of Lasix 1 day before lab done 4/6/18  Called and spoke to [facility] pharmacy on Friday to clarify the duration of patient's medication regimen, [facility] reported patient has been on various brands/generics of Amlodipine/Olmesartan for at least the last 2 years, with recent being VIVEK (brand formulation). Pt verified that she has been taking some formulation of Amlodipine/olemartan for years.     Will discuss recent labs, including hyponatremia, low WBC, + ASMA, + IgG and medication history with Dr. Cota and determine plan. Pt verbalized understanding.

## 2018-04-11 NOTE — TELEPHONE ENCOUNTER
"Called patient to discuss lab results, including + SMA 1:40, +IgG. Recommend liver biopsy to better assess etiology of liver enzyme and autoimmune marker elevation, assess for possible drug induced hepatitis.     Very long discussion with patient about recommendation. She wishes to defer biopsy currently and read more about labs and biopsy benefits and risk online and with her family and will notify me if/whe she wishes to proceed. Discussed with patient US and labs required within 30 days of biopsy so that she can plan timeline with her family. Offered to schedule patient a f/u appt in ~4 weeks to discuss options with her and/or her family and repeat labs, pt does not wish to schedule f/u at this time due to other family circumstances but wishes to communicate through [facility]. Recap email sent via [facility] per patient request so that she can review with her family.     Discussed liver biopsy procedure and possible complications associated with liver biopsy including pain, bleeding, infection, and organ perforation. Reviewed the role of the procedure including confirming of diagnosis and staging of liver disease so pt can be appropriately followed from this point forward.      Patient verbalized understanding of benefit of biopsy, including risk of further liver damage if etiology/cause of enzyme and autoimmune elevation not assessed with biopsy. Discussed with patient s/s of liver dysfunction. Discussed repeating labs in 2-4 weeks to reassess if she does not schedule biopsy, patient does not wish to schedule labs at this time because she "has a lot going on". Will reassess in 2-4 weeks.     Also, hyponatremia noted on labs, does not seem hepatic in origin. Notified PCP Dr. Kenendy of abnormality for further evaluation, pt notified and verbalized understanding.   "

## 2018-04-20 ENCOUNTER — TELEPHONE (OUTPATIENT)
Dept: FAMILY MEDICINE | Facility: CLINIC | Age: 73
End: 2018-04-20

## 2018-04-20 DIAGNOSIS — N95.1 MENOPAUSAL SYMPTOMS: ICD-10-CM

## 2018-04-20 DIAGNOSIS — I10 ESSENTIAL HYPERTENSION, BENIGN: Primary | ICD-10-CM

## 2018-04-20 RX ORDER — AMLODIPINE BESYLATE 10 MG/1
10 TABLET ORAL DAILY
Qty: 30 TABLET | Refills: 2 | Status: SHIPPED | OUTPATIENT
Start: 2018-04-20 | End: 2018-07-18 | Stop reason: SDUPTHER

## 2018-04-20 RX ORDER — OLMESARTAN MEDOXOMIL 40 MG/1
40 TABLET ORAL DAILY
Qty: 30 TABLET | Refills: 2 | Status: SHIPPED | OUTPATIENT
Start: 2018-04-20 | End: 2019-04-20

## 2018-04-20 NOTE — TELEPHONE ENCOUNTER
Please tell her to stop the bisoprolol-hctz and follow up with me next week, will need to adjust meds as they can be causing her sodium to be low. closely monitor pressure at home

## 2018-04-20 NOTE — TELEPHONE ENCOUNTER
Pt states she was told by pharmacist to see if PCP would change amlodipine-olmesartan to separate medications because it would be cheaper for the pt; please advise

## 2018-04-20 NOTE — TELEPHONE ENCOUNTER
----- Message from Gley Kennedy MD sent at 4/19/2018  9:12 PM CDT -----  Regarding: FW: incidental low Na noted on labs during workup   Francheska, can you verify if patient taking Lasix in addition to HCTZ. Also verify i  ----- Message -----  From: Sejal Melgar NP  Sent: 4/11/2018   5:50 PM  To: Gely Kennedy MD  Subject: incidental low Na noted on labs during fidel#    Hi Dr. Kennedy,    I just wanted to make you aware that Ms. Fowler's sodium on her hepatology workup labs was further decreased (looks like she has had some mild chronic decrease in the past), so I rechecked her labs earlier this week, and her sodium has further decreased. I do not suspect any hepatic etiology but I wanted to let you know in case it warrants further w/u on your end.    Update after her visit with me: I recommend a liver biopsy to assess for autoimmune hepatitis and any other possible liver enzyme elevation etiology (since her autoimmune markers are elevated). Pt is going to speak with her family and let me know if/when she is ready to proceed. I have a reminder to myself if I don't hear from her in a couple of weeks    Thanks  GREGORIO Calhoun, FNP-C  Hepatology and Liver Transplant Nurse Practitioner  [facility]  [facility] [place]

## 2018-04-20 NOTE — TELEPHONE ENCOUNTER
Pt informed of below; verbalized understanding; unable to schedule OV at this time because her  is having eye procedure and he is the only one that drives; she will call back to schedule

## 2018-04-25 ENCOUNTER — TELEPHONE (OUTPATIENT)
Dept: FAMILY MEDICINE | Facility: CLINIC | Age: 73
End: 2018-04-25

## 2018-04-25 DIAGNOSIS — E87.1 HYPONATREMIA: Primary | ICD-10-CM

## 2018-04-25 NOTE — TELEPHONE ENCOUNTER
Patient advised to decrease water intake in diet.  Ziac was discontinued.  BP stable.  Will come in for repeat BMP next week.

## 2018-04-25 NOTE — TELEPHONE ENCOUNTER
----- Message from Sejal Melgar NP sent at 4/11/2018  5:50 PM CDT -----  Regarding: incidental low Na noted on labs during workup   Hi Dr. Kennedy,    I just wanted to make you aware that Ms. Fowler's sodium on her hepatology workup labs was further decreased (looks like she has had some mild chronic decrease in the past), so I rechecked her labs earlier this week, and her sodium has further decreased. I do not suspect any hepatic etiology but I wanted to let you know in case it warrants further w/u on your end.    Update after her visit with me: I recommend a liver biopsy to assess for autoimmune hepatitis and any other possible liver enzyme elevation etiology (since her autoimmune markers are elevated). Pt is going to speak with her family and let me know if/when she is ready to proceed. I have a reminder to myself if I don't hear from her in a couple of weeks    Thanks  GREGORIO Calhoun, FNP-C  Hepatology and Liver Transplant Nurse Practitioner  [facility]  [facility] [place]

## 2018-04-25 NOTE — TELEPHONE ENCOUNTER
----- Message from Sejal Melgar NP sent at 4/11/2018  5:50 PM CDT -----  Regarding: incidental low Na noted on labs during workup   Hi Dr. Kennedy,    I just wanted to make you aware that Ms. Fowler's sodium on her hepatology workup labs was further decreased (looks like she has had some mild chronic decrease in the past), so I rechecked her labs earlier this week, and her sodium has further decreased. I do not suspect any hepatic etiology but I wanted to let you know in case it warrants further w/u on your end.    Update after her visit with me: I recommend a liver biopsy to assess for autoimmune hepatitis and any other possible liver enzyme elevation etiology (since her autoimmune markers are elevated). Pt is going to speak with her family and let me know if/when she is ready to proceed. I have a reminder to myself if I don't hear from her in a couple of weeks    Thanks  GREGORIO Calhoun, FNP-C  Hepatology and Liver Transplant Nurse Practitioner  [facility]  [facility] [place]

## 2018-05-09 DIAGNOSIS — R74.8 ELEVATED LIVER ENZYMES: Primary | ICD-10-CM

## 2018-05-18 ENCOUNTER — TELEPHONE (OUTPATIENT)
Dept: HEPATOLOGY | Facility: CLINIC | Age: 73
End: 2018-05-18

## 2018-05-18 NOTE — TELEPHONE ENCOUNTER
"Called patient to discuss noted missed last PCP appt, recommendation to repeat CMP and reconsider further evaluation for elevated liver enzymes. No labs or repeat f/u currently scheduled in hepatology clinic. Left message on listed cell phone number.    Called home phone number, pt answered and immediately reported "I do not go to [facility] anymore. Take me off of your list". I introduced myself and notified her why I was calling. She again reported she does not go to [facility]. Last PCP visit missed also.   "

## 2018-07-18 DIAGNOSIS — I10 ESSENTIAL HYPERTENSION, BENIGN: ICD-10-CM

## 2018-07-18 RX ORDER — AMLODIPINE BESYLATE 10 MG/1
10 TABLET ORAL DAILY
Qty: 30 TABLET | Refills: 2 | Status: SHIPPED | OUTPATIENT
Start: 2018-07-18 | End: 2019-07-18

## 2018-11-02 ENCOUNTER — TELEPHONE (OUTPATIENT)
Dept: SLEEP MEDICINE | Facility: CLINIC | Age: 73
End: 2018-11-02

## 2018-12-13 ENCOUNTER — TELEPHONE (OUTPATIENT)
Dept: FAMILY MEDICINE | Facility: CLINIC | Age: 73
End: 2018-12-13

## 2019-01-05 DIAGNOSIS — N95.1 MENOPAUSAL SYMPTOMS: ICD-10-CM

## 2019-01-05 RX ORDER — ESTROGENS, CONJUGATED 0.62 MG/1
TABLET, FILM COATED ORAL
Qty: 30 TABLET | Refills: 0 | Status: CANCELLED | OUTPATIENT
Start: 2019-01-05

## 2019-01-07 RX ORDER — ESTROGENS, CONJUGATED 0.62 MG/1
TABLET, FILM COATED ORAL
Qty: 30 TABLET | Refills: 0 | OUTPATIENT
Start: 2019-01-07

## 2019-11-19 NOTE — TELEPHONE ENCOUNTER
----- Message from Gely Kennedy MD sent at 2/15/2018  8:56 PM CST -----  No evidence of hepatitis c.  Thyroid jyothi normal.  Please verify if patient will see hepatology for abnormal lft's.  See previous note   99.8